# Patient Record
Sex: MALE | Race: WHITE
[De-identification: names, ages, dates, MRNs, and addresses within clinical notes are randomized per-mention and may not be internally consistent; named-entity substitution may affect disease eponyms.]

---

## 2020-01-17 ENCOUNTER — HOSPITAL ENCOUNTER (OUTPATIENT)
Dept: HOSPITAL 74 - JASU-ENDO | Age: 60
Discharge: HOME | End: 2020-01-17
Attending: INTERNAL MEDICINE
Payer: COMMERCIAL

## 2020-01-17 VITALS — TEMPERATURE: 99.1 F

## 2020-01-17 VITALS — HEART RATE: 91 BPM | SYSTOLIC BLOOD PRESSURE: 128 MMHG | DIASTOLIC BLOOD PRESSURE: 82 MMHG

## 2020-01-17 DIAGNOSIS — I10: ICD-10-CM

## 2020-01-17 DIAGNOSIS — K64.8: ICD-10-CM

## 2020-01-17 DIAGNOSIS — Z12.11: Primary | ICD-10-CM

## 2020-01-17 DIAGNOSIS — Z79.4: ICD-10-CM

## 2020-01-17 DIAGNOSIS — E11.9: ICD-10-CM

## 2020-01-17 DIAGNOSIS — K29.50: ICD-10-CM

## 2020-01-17 DIAGNOSIS — E66.9: ICD-10-CM

## 2020-01-17 DIAGNOSIS — K21.9: ICD-10-CM

## 2020-01-17 DIAGNOSIS — K62.1: ICD-10-CM

## 2020-01-17 PROCEDURE — 0DB38ZX EXCISION OF LOWER ESOPHAGUS, VIA NATURAL OR ARTIFICIAL OPENING ENDOSCOPIC, DIAGNOSTIC: ICD-10-PCS | Performed by: INTERNAL MEDICINE

## 2020-01-17 PROCEDURE — 0DB68ZX EXCISION OF STOMACH, VIA NATURAL OR ARTIFICIAL OPENING ENDOSCOPIC, DIAGNOSTIC: ICD-10-PCS | Performed by: INTERNAL MEDICINE

## 2020-01-17 PROCEDURE — 0DBP8ZX EXCISION OF RECTUM, VIA NATURAL OR ARTIFICIAL OPENING ENDOSCOPIC, DIAGNOSTIC: ICD-10-PCS | Performed by: INTERNAL MEDICINE

## 2020-01-17 PROCEDURE — 0DB98ZX EXCISION OF DUODENUM, VIA NATURAL OR ARTIFICIAL OPENING ENDOSCOPIC, DIAGNOSTIC: ICD-10-PCS | Performed by: INTERNAL MEDICINE

## 2020-01-20 NOTE — PATH
Surgical Pathology Report



Patient Name:  GABRIELLA PATEL

Accession #:  

Med. Rec. #:  [id number]                                                        

   /Age/Gender:  1960 (Age: 59) / M

Account:  [id number]                                                          

             Location: [facility]-ENDOSCOPY

Taken:  2020

Received:  2020

Reported:  2020

Physicians:  Hilda Day M.D.

  



Specimen(s) Received

A: SECOND PORTION DUODENUM AND DUODENAL BULB 

B: ANTRUM 

C: GE JUNCTION 

D: RECTUM POLYP 





Clinical History

Dyspepsia, heartburn, colon cancer screening

Postoperative diagnosis: Erosive gastritis, GERD, rectal polyp, hemorrhoids







Final Diagnosis

A. DUODENUM, SECOND PORTION AND DUODENAL BULB, BIOPSY:

DUODENAL MUCOSA WITHOUT SIGNIFICANT PATHOLOGIC FINDINGS.



B. STOMACH, ANTRUM, BIOPSY:

GASTRIC ANTRAL MUCOSA WITH MODERATE CHRONIC GASTRITIS.

IMMUNOHISTOCHEMICAL STAIN FOR H. PYLORI IS NEGATIVE.



C. GE JUNCTION, BIOPSY:

SQUAMOUS MUCOSA WITH CHANGES OF MILD REFLUX TYPE ESOPHAGITIS.

NO COLUMNAR MUCOSA, INTESTINAL METAPLASIA, OR DYSPLASIA IDENTIFIED.



D. RECTUM, POLYP, BIOPSY:

HYPERPLASTIC POLYP.







***Electronically Signed***

Brigida Cooley M.D.





Gross Description

A.  Received in formalin, labeled "biopsy second portion of duodenum and

duodenal bulb" are 5 tan, irregular portions of soft tissue ranging from 0.2-0.4

cm. in greatest dimension. The specimens are submitted in toto in one cassette.



B.  Received in formalin, labeled "biopsy antrum" are 5 tan, irregular portions

of soft tissue ranging from 0.2-0.5 cm. in greatest dimension. The specimens are

submitted in toto in one cassette.



C.  Received in formalin, labeled "biopsy GE junction" are 2 tan, irregular

portions of soft tissue averaging 0.5 cm. in greatest dimension. The specimens

are submitted in toto in one cassette.



D.  Received in formalin, labeled "biopsy polyp rectum" are 3 tan, irregular

portions of soft tissue ranging from 0.2-0.3 cm. in greatest dimension. The

specimens are submitted in toto in one cassette.

DL



saudi